# Patient Record
Sex: FEMALE | Race: ASIAN | NOT HISPANIC OR LATINO | Employment: FULL TIME | URBAN - METROPOLITAN AREA
[De-identification: names, ages, dates, MRNs, and addresses within clinical notes are randomized per-mention and may not be internally consistent; named-entity substitution may affect disease eponyms.]

---

## 2024-02-02 ENCOUNTER — OFFICE VISIT (OUTPATIENT)
Dept: PRIMARY CARE | Facility: CLINIC | Age: 29
End: 2024-02-02
Payer: COMMERCIAL

## 2024-02-02 VITALS
SYSTOLIC BLOOD PRESSURE: 110 MMHG | OXYGEN SATURATION: 98 % | HEART RATE: 88 BPM | HEIGHT: 67 IN | WEIGHT: 151.2 LBS | BODY MASS INDEX: 23.73 KG/M2 | DIASTOLIC BLOOD PRESSURE: 88 MMHG

## 2024-02-02 DIAGNOSIS — Z00.00 HEALTH CARE MAINTENANCE: ICD-10-CM

## 2024-02-02 DIAGNOSIS — D22.9 BENIGN MOLE: ICD-10-CM

## 2024-02-02 DIAGNOSIS — F41.9 ANXIETY: ICD-10-CM

## 2024-02-02 DIAGNOSIS — L70.9 ACNE, UNSPECIFIED ACNE TYPE: ICD-10-CM

## 2024-02-02 DIAGNOSIS — L65.9 HAIR LOSS: ICD-10-CM

## 2024-02-02 DIAGNOSIS — K59.09 CHRONIC CONSTIPATION: Primary | ICD-10-CM

## 2024-02-02 PROCEDURE — 99395 PREV VISIT EST AGE 18-39: CPT | Performed by: INTERNAL MEDICINE

## 2024-02-02 PROCEDURE — 1036F TOBACCO NON-USER: CPT | Performed by: INTERNAL MEDICINE

## 2024-02-02 RX ORDER — FLUOXETINE 20 MG/1
20 TABLET ORAL DAILY
Qty: 30 TABLET | Refills: 5 | Status: SHIPPED | OUTPATIENT
Start: 2024-02-02 | End: 2024-03-22 | Stop reason: ALTCHOICE

## 2024-02-02 ASSESSMENT — PATIENT HEALTH QUESTIONNAIRE - PHQ9
2. FEELING DOWN, DEPRESSED OR HOPELESS: NOT AT ALL
1. LITTLE INTEREST OR PLEASURE IN DOING THINGS: NOT AT ALL
SUM OF ALL RESPONSES TO PHQ9 QUESTIONS 1 AND 2: 0

## 2024-02-02 NOTE — PROGRESS NOTES
"Reason for Visit: Annual Physical Exam  Allergies and Medications  Penicillins and Latex  No current outpatient medications  HPI:  28-year-old female patient presented to the office today to establish care and discuss concerns and for her annual exam.  Patient is known to have history of chronic constipation since she was a child at one point she was evaluated with gastroenterology.  She currently only uses senna and if she does not change her routine and continue with her over-the-counter supplements she may have a small bowel movement daily.  If anything changes she is a bit constipated.  She denies abdominal pain nausea vomiting she denies any chest pain or difficulty breathing.  She denies urine symptoms.    She is dealing with a lot of anxiety and this is no she does have history of depression she was involved in counseling in the past.  Lately she does not feel good and she is concerned about her thyroid because she has been experiencing some hair loss and difficulty losing weight and she does have.  Of hypothyroidism.    ROS otherwise negative aside from what was mentioned above in HPI.    Vitals  /88 (BP Location: Left arm, Patient Position: Sitting)   Pulse 88   Ht 1.702 m (5' 7\")   Wt 68.6 kg (151 lb 3.2 oz)   SpO2 98%   BMI 23.68 kg/m²   Body mass index is 23.68 kg/m².  Physical Exam  Physical Exam  Constitutional:       Appearance: Normal appearance.   HENT:      Head: Normocephalic and atraumatic.   Eyes:      Extraocular Movements: Extraocular movements intact.      Pupils: Pupils are equal, round, and reactive to light.   Cardiovascular:      Rate and Rhythm: Normal rate and regular rhythm.      Pulses: Normal pulses.      Heart sounds: Normal heart sounds.   Pulmonary:      Effort: Pulmonary effort is normal.      Breath sounds: Normal breath sounds.   Abdominal:      General: Abdomen is flat. Bowel sounds are normal.      Palpations: Abdomen is soft.   Neurological:      General: No focal " deficit present.      Mental Status: She is alert. Mental status is at baseline.   Psychiatric:         Mood and Affect: Mood normal.         Behavior: Behavior normal.         Thought Content: Thought content normal.         Judgment: Judgment normal.      Active Problem List    Comprehensive Medical/Surgical/Social/Family History  No past medical history on file.  No past surgical history on file.  Social History     Social History Narrative    Not on file   Patient does not smoke drinks alcohol occasionally drinks 2 cups of coffee per day and works in a bank  No family history on file.   Family history:  Significant for thyroid disease in her sister's mother and grandmother on mother side.    Assessment and Plan:  Problem List Items Addressed This Visit    None  28-year-old patient with the following issues.    1.  Concerns regarding chronic constipation patient is going to do a trial of MiraLAX to be consistent and try to adjust the dose to achieve a complete clinical response.  And we will be returning to gastroenterology to discuss newer agents for symptom management.    2.  Concerns regarding hearing loss with tiredness inability to lose weight and increase depression and anxiety patient is very concerned about her thyroid since she does have extensive family history thyroid function test will be obtained along with vitamin D level CBC to rule out anemia and we will discuss the results with her once available.    3.  Concerns regarding anxiety and depression I will start the patient on fluoxetine and I will see her back in 6 to 8 weeks and like her to be referred for counseling.    Health maintenance issues lab work was requested she is up-to-date on her Pap smear    Disposition I will see the patient back in the office in 1 year for repeat physical in 6 to 8 weeks for follow-up.

## 2024-02-03 ENCOUNTER — LAB (OUTPATIENT)
Dept: LAB | Facility: LAB | Age: 29
End: 2024-02-03
Payer: COMMERCIAL

## 2024-02-03 DIAGNOSIS — F41.9 ANXIETY: ICD-10-CM

## 2024-02-03 DIAGNOSIS — Z00.00 HEALTH CARE MAINTENANCE: ICD-10-CM

## 2024-02-03 LAB
25(OH)D3 SERPL-MCNC: 21 NG/ML (ref 30–100)
ALBUMIN SERPL BCP-MCNC: 4.2 G/DL (ref 3.4–5)
ALP SERPL-CCNC: 86 U/L (ref 33–110)
ALT SERPL W P-5'-P-CCNC: 15 U/L (ref 7–45)
ANION GAP SERPL CALC-SCNC: 10 MMOL/L (ref 10–20)
AST SERPL W P-5'-P-CCNC: 17 U/L (ref 9–39)
BILIRUB SERPL-MCNC: 0.4 MG/DL (ref 0–1.2)
BUN SERPL-MCNC: 11 MG/DL (ref 6–23)
CALCIUM SERPL-MCNC: 9.1 MG/DL (ref 8.6–10.3)
CHLORIDE SERPL-SCNC: 106 MMOL/L (ref 98–107)
CHOLEST SERPL-MCNC: 150 MG/DL (ref 0–199)
CHOLESTEROL/HDL RATIO: 2.3
CO2 SERPL-SCNC: 27 MMOL/L (ref 21–32)
CREAT SERPL-MCNC: 0.7 MG/DL (ref 0.5–1.05)
EGFRCR SERPLBLD CKD-EPI 2021: >90 ML/MIN/1.73M*2
ERYTHROCYTE [DISTWIDTH] IN BLOOD BY AUTOMATED COUNT: 13 % (ref 11.5–14.5)
GLUCOSE SERPL-MCNC: 86 MG/DL (ref 74–99)
HCT VFR BLD AUTO: 38.6 % (ref 36–46)
HDLC SERPL-MCNC: 66.3 MG/DL
HGB BLD-MCNC: 12.6 G/DL (ref 12–16)
LDLC SERPL CALC-MCNC: 77 MG/DL
MCH RBC QN AUTO: 27.5 PG (ref 26–34)
MCHC RBC AUTO-ENTMCNC: 32.6 G/DL (ref 32–36)
MCV RBC AUTO: 84 FL (ref 80–100)
NON HDL CHOLESTEROL: 84 MG/DL (ref 0–149)
NRBC BLD-RTO: 0 /100 WBCS (ref 0–0)
PLATELET # BLD AUTO: 256 X10*3/UL (ref 150–450)
POTASSIUM SERPL-SCNC: 4.3 MMOL/L (ref 3.5–5.3)
PROT SERPL-MCNC: 6.8 G/DL (ref 6.4–8.2)
RBC # BLD AUTO: 4.58 X10*6/UL (ref 4–5.2)
SODIUM SERPL-SCNC: 139 MMOL/L (ref 136–145)
TRIGL SERPL-MCNC: 35 MG/DL (ref 0–149)
TSH SERPL-ACNC: 2.08 MIU/L (ref 0.44–3.98)
VLDL: 7 MG/DL (ref 0–40)
WBC # BLD AUTO: 5 X10*3/UL (ref 4.4–11.3)

## 2024-02-03 PROCEDURE — 84443 ASSAY THYROID STIM HORMONE: CPT

## 2024-02-03 PROCEDURE — 36415 COLL VENOUS BLD VENIPUNCTURE: CPT

## 2024-02-03 PROCEDURE — 85027 COMPLETE CBC AUTOMATED: CPT

## 2024-02-03 PROCEDURE — 82306 VITAMIN D 25 HYDROXY: CPT

## 2024-02-03 PROCEDURE — 80053 COMPREHEN METABOLIC PANEL: CPT

## 2024-02-03 PROCEDURE — 80061 LIPID PANEL: CPT

## 2024-02-06 ENCOUNTER — APPOINTMENT (OUTPATIENT)
Dept: PRIMARY CARE | Facility: CLINIC | Age: 29
End: 2024-02-06
Payer: COMMERCIAL

## 2024-02-08 ENCOUNTER — OFFICE VISIT (OUTPATIENT)
Dept: DERMATOLOGY | Facility: CLINIC | Age: 29
End: 2024-02-08
Payer: COMMERCIAL

## 2024-02-08 DIAGNOSIS — L70.0 ACNE VULGARIS: ICD-10-CM

## 2024-02-08 DIAGNOSIS — Z12.83 SKIN CANCER SCREENING: Primary | ICD-10-CM

## 2024-02-08 DIAGNOSIS — D22.9 NEVUS: ICD-10-CM

## 2024-02-08 PROCEDURE — 99204 OFFICE O/P NEW MOD 45 MIN: CPT | Performed by: NURSE PRACTITIONER

## 2024-02-08 PROCEDURE — 1036F TOBACCO NON-USER: CPT | Performed by: NURSE PRACTITIONER

## 2024-02-08 RX ORDER — TRETINOIN 0.5 MG/G
CREAM TOPICAL NIGHTLY
Qty: 20 G | Refills: 1 | Status: SHIPPED | OUTPATIENT
Start: 2024-02-08 | End: 2025-02-07

## 2024-02-08 NOTE — PROGRESS NOTES
Subjective     Deb Lovelace is a 28 y.o. female who presents for the following: Skin Check and Acne.     New patient visit patient in for full-body skin exam patient has concerns of moles to right breast right arm back and genitalia.  Patient would also like to address acne to face chest and back present for years off-and-on patient currently using over-the-counter benzyl peroxide 10% wash patient in the past states she has used tretinoin and birth control.    Review of Systems:  No other skin or systemic complaints other than what is documented elsewhere in the note.    The following portions of the chart were reviewed this encounter and updated as appropriate:       Skin Cancer History  No skin cancer on file.    Specialty Problems    None    Past Medical History:  Deb Lovelace  has no past medical history on file.    Past Surgical History:  Deb Lovelace  has no past surgical history on file.    Family History:  Patient family history is not on file.    Social History:  Deb Lovelace  reports that she has never smoked. She has never used smokeless tobacco. She reports that she does not currently use alcohol. She reports that she does not use drugs.    Allergies:  Penicillins and Latex    Current Medications / CAM's:    Current Outpatient Medications:     FLUoxetine (PROzac) 20 mg tablet, Take 1 tablet (20 mg) by mouth once daily., Disp: 30 tablet, Rfl: 5    tretinoin (Retin-A) 0.05 % cream, Apply topically once daily at bedtime. Apply a pea size amount nightly to entire face. Moisturize as needed, Disp: 20 g, Rfl: 1     Objective   Well appearing patient in no apparent distress; mood and affect are within normal limits.    A full examination was performed including scalp, head, eyes, ears, nose, lips, neck, chest, axillae, abdomen, back, buttocks, bilateral upper extremities, bilateral lower extremities, hands, feet, fingers, toes, fingernails, and toenails. All findings within normal limits unless otherwise noted  below.    Assessment/Plan   1. Skin cancer screening    The patient presented for a routine skin examination today. There are no specific concerns regarding skin health and no new or changing moles, lesions, or rashes.     Assessment: Based on the comprehensive skin examination, there were no concerning or abnormal findings. The patient's skin appeared to be in good health, without any notable dermatologic conditions or lesions.    Plan: Given the absence of any significant skin findings, no specific interventions or treatments are warranted at this time. The patient was educated on the importance of regular skin self-examinations and advised to promptly report any changes or concerns. Routine follow-up for a skin examination was recommended.    -These lesions have benign, reassuring patterns on dermoscopy.  -There were no concerning features found on exam today.  -Recommend continued self-observation, and to contact the office if any changes in nevi are  noticed.    Discussed/information given on safe sun practices and use of sunscreen, sun protective clothing or sun avoidance. Recommend to use OTC medication of sunscreen SPF 30 or higher on a daily basis prior to sun exposure to reduce the risk of skin cancer.    Contact Office if: Any lesions change in size, shape or color; itch, bum or bleed.         2. Nevus  Multiple benign appearing flesh colored to pigmented macules and papules     Plan: Counseling.  I counseled the patient regarding the following:  Instructions: Monthly self-skin checks to monitor for any changes in moles are recommended. Expectations: Benign Nevi are pigmented nests of cells within the skin.No treatment is necessary. Contact Office if: Any moles change in size, shape or color; itch, bum or bleed.    3. Acne vulgaris  Head - Anterior (Face)  Scattered comedones and inflammatory papulopustules.    Maintenance of Current Regimen: Please continue using the prescribed topical medications as  directed. Consistency is key to maintaining the improvement achieved so far.    PLAN:  Tretinoin 0.05% cream nightly        Further Treatment Options: If there are no significant improvements or if your acne worsens, we may consider additional treatment options such as oral medications or in-office procedures. However, it is important to note that most cases of mild acne can be managed effectively with the current regimen.    Skin Care Tips: Continue following a gentle cleansing routine, avoiding harsh scrubbing, and using non-comedogenic moisturizers to keep your skin hydrated without clogging pores.    Cleansing Routine: Gentle Cleanser: You have been using a mild, non-comedogenic cleanser to wash your face twice daily. This practice helps to remove excess oil, dirt, and impurities from your skin, minimizing the risk of pore blockage.    Lifestyle Measures: Avoiding Trigger Factors: We also discussed avoiding known triggers such as excessive sun exposure, touching or picking at the acne lesions, and using heavy or comedogenic cosmetic products.    Please feel free to contact our clinic if you have any questions or concerns between appointments. Remember, acne treatment requires patience and consistency.                 tretinoin (Retin-A) 0.05 % cream - Head - Anterior (Face)  Apply topically once daily at bedtime. Apply a pea size amount nightly to entire face. Moisturize as needed

## 2024-02-28 ENCOUNTER — OFFICE VISIT (OUTPATIENT)
Dept: GASTROENTEROLOGY | Facility: CLINIC | Age: 29
End: 2024-02-28
Payer: COMMERCIAL

## 2024-02-28 VITALS
DIASTOLIC BLOOD PRESSURE: 86 MMHG | HEART RATE: 66 BPM | TEMPERATURE: 98 F | SYSTOLIC BLOOD PRESSURE: 123 MMHG | WEIGHT: 146 LBS | HEIGHT: 67 IN | BODY MASS INDEX: 22.91 KG/M2

## 2024-02-28 DIAGNOSIS — K59.09 CHRONIC CONSTIPATION: ICD-10-CM

## 2024-02-28 PROCEDURE — 99213 OFFICE O/P EST LOW 20 MIN: CPT | Performed by: NURSE PRACTITIONER

## 2024-02-28 PROCEDURE — 1036F TOBACCO NON-USER: CPT | Performed by: NURSE PRACTITIONER

## 2024-02-28 RX ORDER — WHEAT DEXTRIN 3 G/4 G
POWDER (GRAM) ORAL
Qty: 248 G | Refills: 1 | Status: SHIPPED | OUTPATIENT
Start: 2024-02-28

## 2024-02-28 ASSESSMENT — ENCOUNTER SYMPTOMS
CARDIOVASCULAR NEGATIVE: 1
ENDOCRINE NEGATIVE: 1
FEVER: 0
PSYCHIATRIC NEGATIVE: 1
NEUROLOGICAL NEGATIVE: 1
RESPIRATORY NEGATIVE: 1
MUSCULOSKELETAL NEGATIVE: 1
DIAPHORESIS: 0
CHEST TIGHTNESS: 0
STRIDOR: 0
ALLERGIC/IMMUNOLOGIC NEGATIVE: 1
HEMATOLOGIC/LYMPHATIC NEGATIVE: 1
DIFFICULTY URINATING: 0
SHORTNESS OF BREATH: 0
CHILLS: 0
APNEA: 0
COUGH: 0
WHEEZING: 0
ROS GI COMMENTS: SEE HPI
FATIGUE: 0
EYES NEGATIVE: 1

## 2024-02-28 NOTE — PATIENT INSTRUCTIONS
It was nice to meet you    You can try magnesium citrate pill over the counter  X 2 weeks    You can take fiber daily (one teaspoon)    You can try Miralax 1-2 times/day x 2 weeks    Follow up in 1 month       Patient Education:    Constipation refers to a change in bowel habits, but it has varied meanings. Stools may be too hard or too small, difficult to pass, or infrequent (less than three times per week). People with constipation may also notice a frequent need to strain and a sense that the bowels are not empty.    Your bowels like consistency and routine.     Behavior changes -- The bowels are most active following meals, and this is often the time when stools will pass most readily. If you ignore your body's signals to have a bowel movement, the signals become weaker and weaker over time.    By paying close attention to these signals, you may have an easier time moving your bowels. Drinking a caffeine-containing beverage in the morning may also be helpful.    Increase fiber -- Increasing fiber in your diet may reduce or eliminate constipation. The recommended amount of dietary fiber is 20 to 35 grams of fiber per day. Examples of high fiber foods include pears, spinach, apples, raspberries.     Fiber side effects -- Consuming large amounts of fiber can cause abdominal bloating or gas; this can be minimized by starting with a small amount and slowly increasing until stools become softer and more frequent.    More recently, kiwi fruit has been identified as helping with constipation. It is recommended to eat two daily.    Whatever you decide to use, please try daily for 2 weeks to notice improvement.     If you continue to have constipation despite trying these methods, please schedule a follow-up appointment.

## 2024-02-28 NOTE — PROGRESS NOTES
Subjective   Patient ID: Deb Lovelace is a 28 y.o. female who presents for GI Problem (Never has bowel  movements).  Typically goes once a day   If she sticks to a routine she has a BM daily  She uses smooth move and this helps    She tried lactose free x 2 weeks     Anxiety makes it worse    Family Hx: Denies a family hx of CRC, GI cancers          Review of Systems   Constitutional:  Negative for chills, diaphoresis, fatigue and fever.   HENT: Negative.     Eyes: Negative.    Respiratory: Negative.  Negative for apnea, cough, chest tightness, shortness of breath, wheezing and stridor.    Cardiovascular: Negative.    Gastrointestinal:         See HPI    Endocrine: Negative.    Genitourinary: Negative.  Negative for difficulty urinating.   Musculoskeletal: Negative.    Skin: Negative.    Allergic/Immunologic: Negative.    Neurological: Negative.    Hematological: Negative.    Psychiatric/Behavioral: Negative.         Objective   Physical Exam  Constitutional:       Appearance: She is normal weight.   Pulmonary:      Effort: Pulmonary effort is normal.   Neurological:      Mental Status: She is alert.   Psychiatric:         Mood and Affect: Mood normal.         Assessment/Plan   Diagnoses and all orders for this visit:  Chronic constipation/bloating  -     Tissue Transglutaminase IgA; Future  -     wheat dextrin (Benefiber Sugar Free, dextrin,) 3 gram/4 gram powder; Take 1 teaspoon daily with a full glass of water (8 ounces)     28 year old female with a PMH of constipation, since childhood presents today for constipation. She is currently using Senna PRN. She will start Miralax, Fiber and will follow up in 1 month. She may benefit GC-C agonist, will discuss at follow up.     STARLA Villa-CNP 02/28/24 10:19 AM

## 2024-03-02 ENCOUNTER — LAB (OUTPATIENT)
Dept: LAB | Facility: LAB | Age: 29
End: 2024-03-02
Payer: COMMERCIAL

## 2024-03-02 DIAGNOSIS — K59.09 CHRONIC CONSTIPATION: ICD-10-CM

## 2024-03-02 PROCEDURE — 36415 COLL VENOUS BLD VENIPUNCTURE: CPT

## 2024-03-02 PROCEDURE — 83516 IMMUNOASSAY NONANTIBODY: CPT

## 2024-03-03 LAB — TTG IGA SER IA-ACNC: <1 U/ML

## 2024-03-22 ENCOUNTER — OFFICE VISIT (OUTPATIENT)
Dept: PRIMARY CARE | Facility: CLINIC | Age: 29
End: 2024-03-22
Payer: COMMERCIAL

## 2024-03-22 VITALS
SYSTOLIC BLOOD PRESSURE: 100 MMHG | HEART RATE: 71 BPM | BODY MASS INDEX: 23.98 KG/M2 | WEIGHT: 152.8 LBS | DIASTOLIC BLOOD PRESSURE: 72 MMHG | OXYGEN SATURATION: 99 % | HEIGHT: 67 IN

## 2024-03-22 DIAGNOSIS — F41.9 ANXIETY: Primary | ICD-10-CM

## 2024-03-22 PROCEDURE — 99214 OFFICE O/P EST MOD 30 MIN: CPT | Performed by: INTERNAL MEDICINE

## 2024-03-22 PROCEDURE — 1036F TOBACCO NON-USER: CPT | Performed by: INTERNAL MEDICINE

## 2024-03-22 RX ORDER — FLUOXETINE HYDROCHLORIDE 40 MG/1
40 CAPSULE ORAL DAILY
Qty: 30 CAPSULE | Refills: 11 | Status: SHIPPED | OUTPATIENT
Start: 2024-03-22 | End: 2024-04-30

## 2024-03-22 ASSESSMENT — PATIENT HEALTH QUESTIONNAIRE - PHQ9
2. FEELING DOWN, DEPRESSED OR HOPELESS: NOT AT ALL
SUM OF ALL RESPONSES TO PHQ9 QUESTIONS 1 AND 2: 0
1. LITTLE INTEREST OR PLEASURE IN DOING THINGS: NOT AT ALL

## 2024-03-22 NOTE — PROGRESS NOTES
"Subjective   Patient ID: [id number]      Deb Lovelace is a 28 y.o. female who presents for Anxiety (Patient was started on Fluoxetine. Patient states she has not noticed much of a difference since starting the medication.).    Current Outpatient Medications:     FLUoxetine (PROzac) 20 mg tablet, Take 1 tablet (20 mg) by mouth once daily., Disp: 30 tablet, Rfl: 5    MAGNESIUM ORAL, Take by mouth., Disp: , Rfl:     tretinoin (Retin-A) 0.05 % cream, Apply topically once daily at bedtime. Apply a pea size amount nightly to entire face. Moisturize as needed, Disp: 20 g, Rfl: 1    wheat dextrin (Benefiber Sugar Free, dextrin,) 3 gram/4 gram powder, Take 1 teaspoon daily with a full glass of water (8 ounces), Disp: 248 g, Rfl: 1  HPI  28-year-old patient presented to the office for follow-up after initiation of fluoxetine.  Patient has been taking 20 mg of Prozac at bedtime initially she had problems with nausea and slight lightheadedness then she switched to taking the medicine at bedtime and since she did that she is feeling a lot better yet she thinks is not helping her so much with her anxiety and depression so we are going to try the higher dose and evaluate.  Patient has no other complaints or concerns today.  She has been seen by gastroenterology for her chronic abdominal bloating and distention & constipation, she is currently taking MiraLAX with magnesium and fiber and she seems to be doing a lot better and she will continue with follow-up with GI.  She has no other complaints.  Review of system was reviewed all normal except what is noted in HPI   No past medical history on file.   Objective   /72 (BP Location: Left arm, Patient Position: Sitting)   Pulse 71   Ht 1.702 m (5' 7\")   Wt 69.3 kg (152 lb 12.8 oz)   SpO2 99%   BMI 23.93 kg/m²      Physical Exam  Constitutional:       Appearance: Normal appearance.   Cardiovascular:      Rate and Rhythm: Normal rate and regular rhythm.      Pulses: Normal pulses. "      Heart sounds: Normal heart sounds.   Pulmonary:      Effort: Pulmonary effort is normal.      Breath sounds: Normal breath sounds.   Neurological:      Mental Status: She is alert.       Assessment/Plan   Problem List Items Addressed This Visit    None    28-year-old patient with the following issues.    1.  Generalized anxiety disorder on fluoxetine, patient is tolerating the medication very nicely we are going to increase the dose to 40 mg p.o. daily and assess her response.    2.  Chronic constipation currently much improved after following a regimen of MiraLAX and fiber and magnesium patient is doing well from that standpoint.    No other active issues or concerns during this visit I will see the patient back in the office as previously scheduled and sooner if needed.  Azul Leos MD

## 2024-03-28 ENCOUNTER — OFFICE VISIT (OUTPATIENT)
Dept: GASTROENTEROLOGY | Facility: CLINIC | Age: 29
End: 2024-03-28
Payer: COMMERCIAL

## 2024-03-28 DIAGNOSIS — K59.09 CHRONIC CONSTIPATION: Primary | ICD-10-CM

## 2024-03-28 PROCEDURE — 99212 OFFICE O/P EST SF 10 MIN: CPT | Performed by: NURSE PRACTITIONER

## 2024-03-28 ASSESSMENT — ENCOUNTER SYMPTOMS
COUGH: 0
MUSCULOSKELETAL NEGATIVE: 1
EYES NEGATIVE: 1
APNEA: 0
STRIDOR: 0
CARDIOVASCULAR NEGATIVE: 1
WHEEZING: 0
ALLERGIC/IMMUNOLOGIC NEGATIVE: 1
ROS GI COMMENTS: SEE HPI
DIAPHORESIS: 0
NEUROLOGICAL NEGATIVE: 1
CHILLS: 0
FATIGUE: 0
PSYCHIATRIC NEGATIVE: 1
FEVER: 0
CHEST TIGHTNESS: 0
RESPIRATORY NEGATIVE: 1
HEMATOLOGIC/LYMPHATIC NEGATIVE: 1
SHORTNESS OF BREATH: 0
ENDOCRINE NEGATIVE: 1
DIFFICULTY URINATING: 0

## 2024-03-28 NOTE — PATIENT INSTRUCTIONS
Decrease benefiber to one teaspoon daily with a full glass of water    Use Miralax 1-2 times/day     Follow up in 1-2 months     Increase dietary fiber    Take a walk after lunch or dinner    Follow up in 1-2 month

## 2024-03-28 NOTE — PROGRESS NOTES
Subjective   Patient ID: Deb Lovelace is a 28 y.o. female who presents for No chief complaint on file.. Last seen in GI on 2/28   Typically goes once a day at last visit started on fiber and Miralax    Has a BM in the morning daily  Having bloating by the afternoon  Cramping and gassy after eating, mostly lower  Feels like she needs to have a BM because gurgling      If she sticks to a routine she has a BM daily  She uses smooth move and this helps    She tried lactose free x 2 weeks     Anxiety makes it worse    Family Hx: Denies a family hx of CRC, GI cancers          Review of Systems   Constitutional:  Negative for chills, diaphoresis, fatigue and fever.   HENT: Negative.     Eyes: Negative.    Respiratory: Negative.  Negative for apnea, cough, chest tightness, shortness of breath, wheezing and stridor.    Cardiovascular: Negative.    Gastrointestinal:         See HPI    Endocrine: Negative.    Genitourinary: Negative.  Negative for difficulty urinating.   Musculoskeletal: Negative.    Skin: Negative.    Allergic/Immunologic: Negative.    Neurological: Negative.    Hematological: Negative.    Psychiatric/Behavioral: Negative.         Objective   Physical Exam  Constitutional:       Appearance: She is normal weight.   Pulmonary:      Effort: Pulmonary effort is normal.   Neurological:      Mental Status: She is alert.   Psychiatric:         Mood and Affect: Mood normal.         Assessment/Plan   Diagnoses and all orders for this visit:  Chronic constipation/bloating       28 year old female with a PMH of constipation, since childhood presents today for constipation. She is currently using Senna PRN. She will start Miralax, Fiber and will follow up in 1 month. She may benefit GC-C agonist, will discuss at follow up.     STARLA Villa-CNP 03/28/24 1:20 PM

## 2024-04-11 DIAGNOSIS — F41.1 GENERALIZED ANXIETY DISORDER: Primary | ICD-10-CM

## 2024-04-18 NOTE — PROGRESS NOTES
Subjective     Deb Lovelace is a 28 y.o. female who presents for the following: Acne.     Established patient in for skin exam 02/2024 follow up for acne last prescribed to:  Tretinoin 0.05% cream nightly    Review of Systems:  No other skin or systemic complaints other than what is documented elsewhere in the note.    The following portions of the chart were reviewed this encounter and updated as appropriate:       Skin Cancer History  No skin cancer on file.    Specialty Problems    None    Past Medical History:  Deb Lovelace  has no past medical history on file.    Past Surgical History:  Deb Lovelace  has no past surgical history on file.    Family History:  Patient family history is not on file.    Social History:  Deb Lovelace  reports that she has never smoked. She has never used smokeless tobacco. She reports that she does not currently use alcohol. She reports that she does not use drugs.    Allergies:  Penicillins and Latex    Current Medications / CAM's:    Current Outpatient Medications:     FLUoxetine (PROzac) 40 mg capsule, Take 1 capsule (40 mg) by mouth once daily., Disp: 30 capsule, Rfl: 11    MAGNESIUM ORAL, Take by mouth., Disp: , Rfl:     tretinoin (Retin-A) 0.05 % cream, Apply topically once daily at bedtime. Apply a pea size amount nightly to entire face. Moisturize as needed, Disp: 20 g, Rfl: 1    wheat dextrin (Benefiber Sugar Free, dextrin,) 3 gram/4 gram powder, Take 1 teaspoon daily with a full glass of water (8 ounces), Disp: 248 g, Rfl: 1     Objective   Well appearing patient in no apparent distress; mood and affect are within normal limits.      Assessment/Plan

## 2024-04-19 ENCOUNTER — TELEMEDICINE (OUTPATIENT)
Dept: DERMATOLOGY | Facility: CLINIC | Age: 29
End: 2024-04-19
Payer: COMMERCIAL
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
## 2024-04-30 ENCOUNTER — LAB (OUTPATIENT)
Dept: LAB | Facility: LAB | Age: 29
End: 2024-04-30
Payer: COMMERCIAL

## 2024-04-30 ENCOUNTER — OFFICE VISIT (OUTPATIENT)
Dept: BEHAVIORAL HEALTH | Facility: CLINIC | Age: 29
End: 2024-04-30
Payer: COMMERCIAL

## 2024-04-30 DIAGNOSIS — F43.10 PTSD (POST-TRAUMATIC STRESS DISORDER): ICD-10-CM

## 2024-04-30 DIAGNOSIS — F90.2 ATTENTION DEFICIT HYPERACTIVITY DISORDER (ADHD), COMBINED TYPE: ICD-10-CM

## 2024-04-30 DIAGNOSIS — F41.1 GENERALIZED ANXIETY DISORDER: ICD-10-CM

## 2024-04-30 DIAGNOSIS — F41.9 ANXIETY: ICD-10-CM

## 2024-04-30 LAB
AMPHETAMINES UR QL SCN: NORMAL
BARBITURATES UR QL SCN: NORMAL
BENZODIAZ UR QL SCN: NORMAL
BZE UR QL SCN: NORMAL
CANNABINOIDS UR QL SCN: NORMAL
FENTANYL+NORFENTANYL UR QL SCN: NORMAL
METHADONE UR QL SCN: NORMAL
OPIATES UR QL SCN: NORMAL
OXYCODONE+OXYMORPHONE UR QL SCN: NORMAL
PCP UR QL SCN: NORMAL

## 2024-04-30 PROCEDURE — 80307 DRUG TEST PRSMV CHEM ANLYZR: CPT

## 2024-04-30 PROCEDURE — G2212 PROLONG OUTPT/OFFICE VIS: HCPCS | Performed by: STUDENT IN AN ORGANIZED HEALTH CARE EDUCATION/TRAINING PROGRAM

## 2024-04-30 PROCEDURE — 99205 OFFICE O/P NEW HI 60 MIN: CPT | Performed by: STUDENT IN AN ORGANIZED HEALTH CARE EDUCATION/TRAINING PROGRAM

## 2024-04-30 RX ORDER — FLUOXETINE HYDROCHLORIDE 20 MG/1
60 CAPSULE ORAL DAILY
Qty: 90 CAPSULE | Refills: 1 | Status: SHIPPED | OUTPATIENT
Start: 2024-04-30 | End: 2024-05-14

## 2024-04-30 ASSESSMENT — ANXIETY QUESTIONNAIRES
1. FEELING NERVOUS, ANXIOUS, OR ON EDGE: NEARLY EVERY DAY
3. WORRYING TOO MUCH ABOUT DIFFERENT THINGS: NEARLY EVERY DAY
4. TROUBLE RELAXING: NEARLY EVERY DAY
7. FEELING AFRAID AS IF SOMETHING AWFUL MIGHT HAPPEN: MORE THAN HALF THE DAYS
2. NOT BEING ABLE TO STOP OR CONTROL WORRYING: NEARLY EVERY DAY
GAD7 TOTAL SCORE: 19
6. BECOMING EASILY ANNOYED OR IRRITABLE: MORE THAN HALF THE DAYS
IF YOU CHECKED OFF ANY PROBLEMS ON THIS QUESTIONNAIRE, HOW DIFFICULT HAVE THESE PROBLEMS MADE IT FOR YOU TO DO YOUR WORK, TAKE CARE OF THINGS AT HOME, OR GET ALONG WITH OTHER PEOPLE: VERY DIFFICULT
5. BEING SO RESTLESS THAT IT IS HARD TO SIT STILL: NEARLY EVERY DAY

## 2024-04-30 NOTE — ASSESSMENT & PLAN NOTE
We discussed how PTSD may also cause difficulty concentrating, but ADHD symptoms do seem to be unique from PTSD and worth a stimulant trial, which will give us a quick answer.  - Trial Adderall XR pending UDS  - 3 meals/day  - long term psych

## 2024-04-30 NOTE — Clinical Note
MELVINA - complicated enough picture that she'll work with psych going forward, anticipate it won't take more than several months

## 2024-04-30 NOTE — PATIENT INSTRUCTIONS
- provide urine sample, then I will prescribe Adderall XR 10 mg daily  - increase fluoxetine to 60 mg daily  - 3 meals/day    Please send me a message in [email or web address] if things aren't going as expected or you are unsure about something we discussed. If this isn't working, you can call the psychiatry department line at [phone], or leave me a voicemail at [phone].  If you are having thoughts of harming yourself or ending your life, please call the national suicide hotline 24/7 at 623. For this and other mental health emergencies, such as losing touch with reality, call the [facility] 24/7 mobile crisis hotline at 178-093-7842, or dial 911 for emergency services.

## 2024-05-01 DIAGNOSIS — F90.2 ATTENTION DEFICIT HYPERACTIVITY DISORDER (ADHD), COMBINED TYPE: ICD-10-CM

## 2024-05-01 RX ORDER — DEXTROAMPHETAMINE SACCHARATE, AMPHETAMINE ASPARTATE MONOHYDRATE, DEXTROAMPHETAMINE SULFATE AND AMPHETAMINE SULFATE 2.5; 2.5; 2.5; 2.5 MG/1; MG/1; MG/1; MG/1
10 CAPSULE, EXTENDED RELEASE ORAL DAILY
Qty: 30 CAPSULE | Refills: 0 | Status: SHIPPED | OUTPATIENT
Start: 2024-05-01 | End: 2024-05-14 | Stop reason: ALTCHOICE

## 2024-05-14 ENCOUNTER — TELEMEDICINE (OUTPATIENT)
Dept: BEHAVIORAL HEALTH | Facility: CLINIC | Age: 29
End: 2024-05-14
Payer: COMMERCIAL

## 2024-05-14 DIAGNOSIS — F41.1 GENERALIZED ANXIETY DISORDER: ICD-10-CM

## 2024-05-14 DIAGNOSIS — F43.10 PTSD (POST-TRAUMATIC STRESS DISORDER): ICD-10-CM

## 2024-05-14 DIAGNOSIS — F90.2 ATTENTION DEFICIT HYPERACTIVITY DISORDER (ADHD), COMBINED TYPE: ICD-10-CM

## 2024-05-14 PROCEDURE — 99215 OFFICE O/P EST HI 40 MIN: CPT | Performed by: STUDENT IN AN ORGANIZED HEALTH CARE EDUCATION/TRAINING PROGRAM

## 2024-05-14 RX ORDER — FLUOXETINE HYDROCHLORIDE 20 MG/1
60 CAPSULE ORAL DAILY
Qty: 90 CAPSULE | Refills: 2 | Status: SHIPPED | OUTPATIENT
Start: 2024-05-14 | End: 2024-08-12

## 2024-05-14 RX ORDER — DEXTROAMPHETAMINE SACCHARATE, AMPHETAMINE ASPARTATE MONOHYDRATE, DEXTROAMPHETAMINE SULFATE AND AMPHETAMINE SULFATE 2.5; 2.5; 2.5; 2.5 MG/1; MG/1; MG/1; MG/1
10 CAPSULE, EXTENDED RELEASE ORAL EVERY MORNING
Qty: 30 CAPSULE | Refills: 0 | Status: SHIPPED | OUTPATIENT
Start: 2024-05-14 | End: 2024-06-13

## 2024-05-14 RX ORDER — DEXTROAMPHETAMINE SACCHARATE, AMPHETAMINE ASPARTATE MONOHYDRATE, DEXTROAMPHETAMINE SULFATE AND AMPHETAMINE SULFATE 2.5; 2.5; 2.5; 2.5 MG/1; MG/1; MG/1; MG/1
10 CAPSULE, EXTENDED RELEASE ORAL EVERY MORNING
Qty: 30 CAPSULE | Refills: 0 | Status: SHIPPED | OUTPATIENT
Start: 2024-06-13 | End: 2024-07-13

## 2024-05-14 RX ORDER — DEXTROAMPHETAMINE SACCHARATE, AMPHETAMINE ASPARTATE MONOHYDRATE, DEXTROAMPHETAMINE SULFATE AND AMPHETAMINE SULFATE 2.5; 2.5; 2.5; 2.5 MG/1; MG/1; MG/1; MG/1
10 CAPSULE, EXTENDED RELEASE ORAL EVERY MORNING
Qty: 30 CAPSULE | Refills: 0 | Status: SHIPPED | OUTPATIENT
Start: 2024-07-13 | End: 2024-08-12

## 2024-05-14 NOTE — ASSESSMENT & PLAN NOTE
Fluoxetine possibly helping but need more time to de-fuse avoidance busy-ness (which contributes to insomnia).  - Unwinding Anxiety  - [facility] to find therapist  - Psych referral: given phone number to schedule for longer-term psych

## 2024-05-14 NOTE — PATIENT INSTRUCTIONS
- Unwinding Anxiety  - [facility] to find therapist  - Psych referral: given phone number to schedule for longer-term psych  - 3 months' supply of medications    Please send me a message in [email or web address] if things aren't going as expected or you are unsure about something we discussed. If this isn't working, you can call the psychiatry department line at [phone], or leave me a voicemail at [phone].  If you are having thoughts of harming yourself or ending your life, please call the national suicide hotline 24/7 at 801. For this and other mental health emergencies, such as losing touch with reality, call the [facility] 24/7 mobile crisis hotline at 356-517-9986, or dial 911 for emergency services.

## 2024-05-14 NOTE — ASSESSMENT & PLAN NOTE
Likely a component of PTSD contributing, but reason to suspect ADHD as well and symptoms have improved significantly with Adderall XR 10 mg.  - 3 months refills written

## 2024-05-14 NOTE — PROGRESS NOTES
Psychiatry Access Clinic Follow-Up    Subjective   Deb Lovelace is a 28 y.o. female with WAQAR, PTSD and likely ADHD.    Previous Treatment Plan  Generalized anxiety disorder F41.1      Relevant Medications     FLUoxetine (PROzac) 20 mg capsule     Other Relevant Orders     Follow Up In Psychiatry     Referral to Psychiatry     PTSD (post-traumatic stress disorder) F43.10       - Titrate fluoxetine to effect (increase to 60 mg daily today)  - therapy           Relevant Medications     FLUoxetine (PROzac) 20 mg capsule     Other Relevant Orders     Follow Up In Psychiatry     Referral to Psychiatry     Referral to Psychology     Attention deficit hyperactivity disorder (ADHD), combined type F90.2       We discussed how PTSD may also cause difficulty concentrating, but ADHD symptoms do seem to be unique from PTSD and worth a stimulant trial, which will give us a quick answer.  - Trial Adderall XR pending UDS  - 3 meals/day  - long term psych           Relevant Orders     Follow Up In Psychiatry     Drug Screen, Urine With Reflex to Confirmation     Referral to Psychiatry       Interim History  No long term psych intake scheduled yet    Focus/Calm: Feels like she can choose to focus without getting distracted. Lasts until around 4-5 pm.  Middle insomnia seemed to be worse initially, but is back to baseline. Thinks about things around the house that need to get done. This is the most troublesome thing for her.  re-experiencing  internal and external avoidance of reminders: noticed something that would normally trigger her and wasn't as big a deal as normal; however, still trying to to think about it  strong hypervigilance: no noticeable change  Therapy: referred to local places, but she's been out of town    Psychiatric Medications  Fluoxetine 60 mg daily  Adderall XR 10 mg daily    Objective   Mental Status Exam:  General Appearance: Well groomed, appropriate eye contact  Attitude/Behavior: Cooperative  Motor: No  psychomotor agitation or retardation, no tremor or other abnormal movements  Speech: Normal rate, volume, prosody  Mood: fine  Affect: Euthymic, full-range  Thought Process: Linear, goal directed  Thought Associations: No loosening of associations  Thought Content: Normal  Perception: No perceptual abnormalities noted  Sensorium: Alert  Insight: Intact  Judgement: Intact  Cognition: Cognitively intact to conversational testing with respect to attention, orientation, fund of knowledge, recent and remote memory, and language    OARRS:  Adderall 5/1  Carol Araya MD on 5/14/2024  6:23 AM  I have personally reviewed the OARRS report for Deb Lovelace. I have considered the risks of abuse, dependence, addiction and diversion    Is the patient prescribed a combination of a benzodiazepine and opioid?  No    Last Urine Drug Screen / ordered today: No  Recent Results (from the past 8760 hour(s))   Drug Screen, Urine With Reflex to Confirmation    Collection Time: 04/30/24 12:37 PM   Result Value Ref Range    Amphetamine Screen, Urine Presumptive Negative Presumptive Negative    Barbiturate Screen, Urine Presumptive Negative Presumptive Negative    Benzodiazepines Screen, Urine Presumptive Negative Presumptive Negative    Cannabinoid Screen, Urine Presumptive Negative Presumptive Negative    Cocaine Metabolite Screen, Urine Presumptive Negative Presumptive Negative    Fentanyl Screen, Urine Presumptive Negative Presumptive Negative    Opiate Screen, Urine Presumptive Negative Presumptive Negative    Oxycodone Screen, Urine Presumptive Negative Presumptive Negative    PCP Screen, Urine Presumptive Negative Presumptive Negative    Methadone Screen, Urine Presumptive Negative Presumptive Negative     Results are as expected.       Lab Review:   not applicable    Assessment/Plan     Problem List Items Addressed This Visit             ICD-10-CM    Generalized anxiety disorder F41.1    Relevant Medications    FLUoxetine (PROzac)  20 mg capsule    PTSD (post-traumatic stress disorder) F43.10     Fluoxetine possibly helping but need more time to de-fuse avoidance busy-ness (which contributes to insomnia).  - Unwinding Anxiety  - [facility] to find therapist  - Psych referral: given phone number to schedule for longer-term psych         Relevant Medications    FLUoxetine (PROzac) 20 mg capsule    Attention deficit hyperactivity disorder (ADHD), combined type F90.2     Likely a component of PTSD contributing, but reason to suspect ADHD as well and symptoms have improved significantly with Adderall XR 10 mg.  - 3 months refills written         Relevant Medications    amphetamine-dextroamphetamine XR (Adderall XR) 10 mg 24 hr capsule    amphetamine-dextroamphetamine XR (Adderall XR) 10 mg 24 hr capsule (Start on 6/13/2024)    amphetamine-dextroamphetamine XR (Adderall XR) 10 mg 24 hr capsule (Start on 7/13/2024)     No further appointments with me; referred to long-term psychiatry.    Suicide Risk Assessment  There are no new risk factors for suicide and imminent risk remains low; therefore, Deb Lovelace does not require further risk mitigation.    I provided the mobile crisis number and encouraged calling this, 488 or 911 in case of a mental health crisis, including feeling suicidal or losing touch with reality.

## 2024-06-12 ENCOUNTER — LAB REQUISITION (OUTPATIENT)
Dept: LAB | Facility: HOSPITAL | Age: 29
End: 2024-06-12
Payer: COMMERCIAL

## 2024-06-12 DIAGNOSIS — R07.0 PAIN IN THROAT: ICD-10-CM

## 2024-06-12 DIAGNOSIS — J02.9 ACUTE PHARYNGITIS, UNSPECIFIED: ICD-10-CM

## 2024-06-12 PROCEDURE — 87651 STREP A DNA AMP PROBE: CPT

## 2024-06-13 ENCOUNTER — APPOINTMENT (OUTPATIENT)
Dept: PRIMARY CARE | Facility: CLINIC | Age: 29
End: 2024-06-13
Payer: COMMERCIAL

## 2024-06-13 LAB — S PYO DNA THROAT QL NAA+PROBE: NOT DETECTED

## 2024-08-16 DIAGNOSIS — F90.2 ATTENTION DEFICIT HYPERACTIVITY DISORDER (ADHD), COMBINED TYPE: ICD-10-CM

## 2024-08-16 RX ORDER — DEXTROAMPHETAMINE SACCHARATE, AMPHETAMINE ASPARTATE MONOHYDRATE, DEXTROAMPHETAMINE SULFATE AND AMPHETAMINE SULFATE 2.5; 2.5; 2.5; 2.5 MG/1; MG/1; MG/1; MG/1
10 CAPSULE, EXTENDED RELEASE ORAL EVERY MORNING
Qty: 30 CAPSULE | Refills: 0 | Status: SHIPPED | OUTPATIENT
Start: 2024-08-16 | End: 2024-09-15

## 2024-08-23 ENCOUNTER — TELEPHONE (OUTPATIENT)
Dept: PRIMARY CARE | Facility: CLINIC | Age: 29
End: 2024-08-23
Payer: COMMERCIAL

## 2024-08-23 ENCOUNTER — PHARMACY VISIT (OUTPATIENT)
Dept: PHARMACY | Facility: CLINIC | Age: 29
End: 2024-08-23
Payer: COMMERCIAL

## 2024-08-23 DIAGNOSIS — F90.2 ATTENTION DEFICIT HYPERACTIVITY DISORDER (ADHD), COMBINED TYPE: ICD-10-CM

## 2024-08-23 PROCEDURE — RXMED WILLOW AMBULATORY MEDICATION CHARGE

## 2024-08-23 RX ORDER — DEXTROAMPHETAMINE SACCHARATE, AMPHETAMINE ASPARTATE MONOHYDRATE, DEXTROAMPHETAMINE SULFATE AND AMPHETAMINE SULFATE 2.5; 2.5; 2.5; 2.5 MG/1; MG/1; MG/1; MG/1
10 CAPSULE, EXTENDED RELEASE ORAL EVERY MORNING
Qty: 30 CAPSULE | Refills: 0 | Status: SHIPPED | OUTPATIENT
Start: 2024-08-23 | End: 2024-09-22

## 2025-01-23 DIAGNOSIS — F90.2 ATTENTION DEFICIT HYPERACTIVITY DISORDER (ADHD), COMBINED TYPE: ICD-10-CM

## 2025-01-23 RX ORDER — DEXTROAMPHETAMINE SACCHARATE, AMPHETAMINE ASPARTATE MONOHYDRATE, DEXTROAMPHETAMINE SULFATE AND AMPHETAMINE SULFATE 2.5; 2.5; 2.5; 2.5 MG/1; MG/1; MG/1; MG/1
10 CAPSULE, EXTENDED RELEASE ORAL EVERY MORNING
Qty: 30 CAPSULE | Refills: 0 | Status: CANCELLED | OUTPATIENT
Start: 2025-01-23 | End: 2025-02-22

## 2025-02-07 ENCOUNTER — APPOINTMENT (OUTPATIENT)
Dept: PRIMARY CARE | Facility: CLINIC | Age: 30
End: 2025-02-07
Payer: COMMERCIAL

## 2025-02-07 VITALS
BODY MASS INDEX: 23.3 KG/M2 | HEIGHT: 66 IN | WEIGHT: 145 LBS | SYSTOLIC BLOOD PRESSURE: 120 MMHG | TEMPERATURE: 98 F | RESPIRATION RATE: 18 BRPM | HEART RATE: 64 BPM | OXYGEN SATURATION: 98 % | DIASTOLIC BLOOD PRESSURE: 80 MMHG

## 2025-02-07 DIAGNOSIS — Z00.00 HEALTH CARE MAINTENANCE: ICD-10-CM

## 2025-02-07 DIAGNOSIS — F32.A DEPRESSION, UNSPECIFIED DEPRESSION TYPE: Primary | ICD-10-CM

## 2025-02-07 DIAGNOSIS — F90.2 ATTENTION DEFICIT HYPERACTIVITY DISORDER (ADHD), COMBINED TYPE: ICD-10-CM

## 2025-02-07 PROCEDURE — 3008F BODY MASS INDEX DOCD: CPT | Performed by: INTERNAL MEDICINE

## 2025-02-07 PROCEDURE — 99395 PREV VISIT EST AGE 18-39: CPT | Performed by: INTERNAL MEDICINE

## 2025-02-07 PROCEDURE — 1036F TOBACCO NON-USER: CPT | Performed by: INTERNAL MEDICINE

## 2025-02-07 RX ORDER — BUPROPION HYDROCHLORIDE 150 MG/1
150 TABLET, EXTENDED RELEASE ORAL 2 TIMES DAILY
Qty: 60 TABLET | Refills: 1 | Status: SHIPPED | OUTPATIENT
Start: 2025-02-07 | End: 2025-04-08

## 2025-02-07 RX ORDER — DEXTROAMPHETAMINE SACCHARATE, AMPHETAMINE ASPARTATE MONOHYDRATE, DEXTROAMPHETAMINE SULFATE AND AMPHETAMINE SULFATE 2.5; 2.5; 2.5; 2.5 MG/1; MG/1; MG/1; MG/1
10 CAPSULE, EXTENDED RELEASE ORAL EVERY MORNING
Qty: 30 CAPSULE | Refills: 0 | Status: SHIPPED | OUTPATIENT
Start: 2025-02-07 | End: 2025-03-09

## 2025-02-07 RX ORDER — SERTRALINE HYDROCHLORIDE 100 MG/1
100 TABLET, FILM COATED ORAL DAILY
COMMUNITY
End: 2025-02-07 | Stop reason: SDUPTHER

## 2025-02-07 RX ORDER — SERTRALINE HYDROCHLORIDE 100 MG/1
100 TABLET, FILM COATED ORAL DAILY
Qty: 30 TABLET | Refills: 3 | Status: SHIPPED | OUTPATIENT
Start: 2025-02-07

## 2025-02-07 ASSESSMENT — PATIENT HEALTH QUESTIONNAIRE - PHQ9
SUM OF ALL RESPONSES TO PHQ9 QUESTIONS 1 AND 2: 0
2. FEELING DOWN, DEPRESSED OR HOPELESS: NOT AT ALL
1. LITTLE INTEREST OR PLEASURE IN DOING THINGS: NOT AT ALL

## 2025-02-07 NOTE — PROGRESS NOTES
"Reason for Visit: Annual Physical Exam  Allergies and Medications  Penicillins and Latex  Current Outpatient Medications   Medication Instructions    amphetamine-dextroamphetamine XR (Adderall XR) 10 mg 24 hr capsule 10 mg, oral, Every morning, Do not crush or chew.    amphetamine-dextroamphetamine XR (Adderall XR) 10 mg 24 hr capsule 10 mg, oral, Every morning, Do not crush or chew.    amphetamine-dextroamphetamine XR (Adderall XR) 10 mg 24 hr capsule 10 mg, oral, Every morning, Do not crush or chew.    FLUoxetine (PROZAC) 60 mg, oral, Daily    MAGNESIUM ORAL Take by mouth.    sertraline (ZOLOFT) 100 mg, Daily    tretinoin (Retin-A) 0.05 % cream Topical, Nightly, Apply a pea size amount nightly to entire face. Moisturize as needed    wheat dextrin (Benefiber Sugar Free, dextrin,) 3 gram/4 gram powder Take 1 teaspoon daily with a full glass of water (8 ounces)     HPI:  Deb is a 29-year-old female patient presented to the office today for her annual exam.  Patient is known to have history of ADHD currently doing very well on her current medication she receives Adderall XR 10 mg p.o. daily her last urine drug screen was done in April 2024.  She is compliant.    Patient is also known to have history of depression and anxiety currently on Zoloft she feels there is definitely room for improvement wondering if we could add another medication or modify her medication to achieve better clinical response.    She denies chest pain and shortness of breath not even on exertion she denies abdominal pain nausea vomiting changes in the bowel movements or blood in the stool she denies urine symptoms.    ROS otherwise negative aside from what was mentioned above in HPI.    Vitals  /80   Pulse 64   Temp 36.7 °C (98 °F)   Resp 18   Ht 1.676 m (5' 6\")   Wt 65.8 kg (145 lb)   LMP 02/07/2025 (Exact Date)   SpO2 98%   BMI 23.40 kg/m²   Body mass index is 23.4 kg/m².  Physical Exam  Physical Exam  Constitutional:       " Appearance: Normal appearance.   HENT:      Head: Normocephalic and atraumatic.      Nose: Nose normal.   Eyes:      Extraocular Movements: Extraocular movements intact.      Pupils: Pupils are equal, round, and reactive to light.   Cardiovascular:      Rate and Rhythm: Normal rate and regular rhythm.      Pulses: Normal pulses.      Heart sounds: Normal heart sounds.   Pulmonary:      Effort: Pulmonary effort is normal.      Breath sounds: Normal breath sounds.   Chest:      Comments: Breast exam completed no masses asymmetry or discharge.  There is a dark mole noted over the right breast that has been stable from previous.  Status post dermatology evaluation.  Abdominal:      General: Abdomen is flat. Bowel sounds are normal.      Palpations: Abdomen is soft.   Neurological:      General: No focal deficit present.      Mental Status: She is alert. Mental status is at baseline.   Psychiatric:         Mood and Affect: Mood normal.         Thought Content: Thought content normal.         Judgment: Judgment normal.        Active Problem List    Comprehensive Medical/Surgical/Social/Family History  No past medical history on file.  No past surgical history on file.  Social History     Social History Narrative    Not on file   Patient does not smoke drinks alcohol socially drinks 1 cup of coffee per day Works from home single no children.  No family history on file.   Family history:  Negative for colon cancer or breast cancer.  She has 2 brothers in good health.  Parents are living.    Assessment and Plan:  Problem List Items Addressed This Visit    None  29-year-old patient with the following issues.    1.  Concerns regarding ongoing depression and anxiety currently on 100 mg of Zoloft we will add Wellbutrin and I will see the patient back for follow-up.    2.  ADHD currently compliant most recent drug screen April 2024 refill will be provided.    3.  Healthcare maintenance, lab work is requested patient is up-to-date  on her Pap smear.    Disposition I will see the patient back in the office in 1 year for repeat physical and sooner if needed.

## 2025-03-02 DIAGNOSIS — F32.A DEPRESSION, UNSPECIFIED DEPRESSION TYPE: ICD-10-CM

## 2025-03-03 RX ORDER — BUPROPION HYDROCHLORIDE 150 MG/1
150 TABLET, EXTENDED RELEASE ORAL 2 TIMES DAILY
Qty: 180 TABLET | Refills: 2 | Status: SHIPPED | OUTPATIENT
Start: 2025-03-03 | End: 2026-03-03

## 2025-03-31 ENCOUNTER — PATIENT MESSAGE (OUTPATIENT)
Dept: PRIMARY CARE | Facility: CLINIC | Age: 30
End: 2025-03-31
Payer: COMMERCIAL

## 2025-03-31 DIAGNOSIS — F90.2 ATTENTION DEFICIT HYPERACTIVITY DISORDER (ADHD), COMBINED TYPE: ICD-10-CM

## 2025-04-01 RX ORDER — DEXTROAMPHETAMINE SACCHARATE, AMPHETAMINE ASPARTATE MONOHYDRATE, DEXTROAMPHETAMINE SULFATE AND AMPHETAMINE SULFATE 2.5; 2.5; 2.5; 2.5 MG/1; MG/1; MG/1; MG/1
10 CAPSULE, EXTENDED RELEASE ORAL EVERY MORNING
Qty: 30 CAPSULE | Refills: 0 | Status: SHIPPED | OUTPATIENT
Start: 2025-04-01 | End: 2025-05-01

## 2025-06-03 DIAGNOSIS — F32.A DEPRESSION, UNSPECIFIED DEPRESSION TYPE: ICD-10-CM

## 2025-06-03 RX ORDER — SERTRALINE HYDROCHLORIDE 100 MG/1
100 TABLET, FILM COATED ORAL DAILY
Qty: 90 TABLET | Refills: 1 | Status: SHIPPED | OUTPATIENT
Start: 2025-06-03